# Patient Record
Sex: MALE | Race: WHITE | NOT HISPANIC OR LATINO | Employment: OTHER | URBAN - METROPOLITAN AREA
[De-identification: names, ages, dates, MRNs, and addresses within clinical notes are randomized per-mention and may not be internally consistent; named-entity substitution may affect disease eponyms.]

---

## 2017-01-27 ENCOUNTER — TELEPHONE (OUTPATIENT)
Dept: INTERNAL MEDICINE | Facility: CLINIC | Age: 57
End: 2017-01-27

## 2017-01-27 DIAGNOSIS — G89.29 CHRONIC BILATERAL LOW BACK PAIN WITH LEFT-SIDED SCIATICA: Primary | ICD-10-CM

## 2017-01-27 DIAGNOSIS — M54.42 CHRONIC BILATERAL LOW BACK PAIN WITH LEFT-SIDED SCIATICA: Primary | ICD-10-CM
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
## 2017-01-27 NOTE — TELEPHONE ENCOUNTER
----- Message from Felicia Green sent at 1/27/2017  2:52 PM CST -----  Contact: Danny(friend)  _x  1st Request  _  2nd Request  _  3rd Request        Who: Danny(friend)    Why: Patient would like a letter stating that he is unable to work. Please give patient a call back to discuss. thanks!    What Number to Call Back: [phone] or [phone]    When to Expect a call back: (Before the end of the day)   -- if the call is after 12:00, the call back will be tomorrow.

## 2017-01-30 NOTE — TELEPHONE ENCOUNTER
Pt care taker states can you write him a temporary letter stating pt cannot work, after reviewing pt chart, did not see order for [facility],please advise

## 2017-02-02 NOTE — TELEPHONE ENCOUNTER
Contacted patient friend to inform him patient needs to be evaluated by [facility] for his disabilities. He stated he will contact his other friend and find out which one can bring him to his appointment. The telephone number to [facility] was given. They will contact us back if further assistance is needed.

## 2017-02-03 ENCOUNTER — TELEPHONE (OUTPATIENT)
Dept: INTERNAL MEDICINE | Facility: CLINIC | Age: 57
End: 2017-02-03

## 2017-02-03 NOTE — TELEPHONE ENCOUNTER
Pt states that he already has an appointment set up for 2/13. Pt has no further questions/concerns at this time.

## 2017-02-03 NOTE — TELEPHONE ENCOUNTER
----- Message from Akosua Call sent at 2/3/2017 10:16 AM CST -----  Contact: Danny(friend)  X  1st Request  _  2nd Request  _  3rd Request        Who: Danny(friend)    Why: Pt friend is calling to find out about getting the pt scheduled with [facility].  I tried to get pt scheduled with [facility] however they aren't accepting medicaid pt.  Pt says that he has not been approved for medicaid and would like to be seen as a cash pt.  Please contact pt friend to further discuss and advise.    What Number to Call Back:  [phone] or [phone]    When to Expect a call back: (Before the end of the day)   -- if the call is after 12:00, the call back will be tomorrow.

## 2017-02-13 ENCOUNTER — TELEPHONE (OUTPATIENT)
Dept: INTERNAL MEDICINE | Facility: CLINIC | Age: 57
End: 2017-02-13

## 2017-02-13 ENCOUNTER — OFFICE VISIT (OUTPATIENT)
Dept: PHYSICAL MEDICINE AND REHAB | Facility: CLINIC | Age: 57
End: 2017-02-13
Payer: MEDICAID

## 2017-02-13 VITALS
WEIGHT: 116.88 LBS | DIASTOLIC BLOOD PRESSURE: 110 MMHG | BODY MASS INDEX: 18.35 KG/M2 | HEIGHT: 67 IN | HEART RATE: 77 BPM | SYSTOLIC BLOOD PRESSURE: 202 MMHG

## 2017-02-13 DIAGNOSIS — M54.42 CHRONIC MIDLINE LOW BACK PAIN WITH LEFT-SIDED SCIATICA: Primary | ICD-10-CM

## 2017-02-13 DIAGNOSIS — G89.29 CHRONIC MIDLINE LOW BACK PAIN WITH LEFT-SIDED SCIATICA: Primary | ICD-10-CM

## 2017-02-13 DIAGNOSIS — M47.816 LUMBAR FACET ARTHROPATHY: ICD-10-CM

## 2017-02-13 DIAGNOSIS — M51.36 DDD (DEGENERATIVE DISC DISEASE), LUMBAR: ICD-10-CM

## 2017-02-13 PROCEDURE — 99999 PR PBB SHADOW E&M-EST. PATIENT-LVL II: CPT | Mod: PBBFAC,,, | Performed by: PHYSICAL MEDICINE & REHABILITATION

## 2017-02-13 PROCEDURE — 99499 UNLISTED E&M SERVICE: CPT | Mod: S$PBB,,, | Performed by: PHYSICAL MEDICINE & REHABILITATION

## 2017-02-13 PROCEDURE — 99212 OFFICE O/P EST SF 10 MIN: CPT | Mod: PBBFAC | Performed by: PHYSICAL MEDICINE & REHABILITATION

## 2017-02-13 NOTE — TELEPHONE ENCOUNTER
----- Message from Felicia Green sent at 2/13/2017 10:16 AM CST -----  Contact: Ember Casillas (Friend)  _x  1st Request  _  2nd Request  _  3rd Request        Who: Ember Casillas (Friend)    Why: Patient's friend would like a call back says patient was suppose to be seen today at a rehab facility but the doctor that patient was scheduled with was for pain. [place] would like to see someone today by a physical therapist .  Please give patient a call back at your earliest convenience. Thanks!       What Number to Call Back: [phone]    When to Expect a call back: (Before the end of the day)   -- if the call is after 12:00, the call back will be tomorrow.

## 2017-02-13 NOTE — TELEPHONE ENCOUNTER
Patient friend contacted the office stating Dr. Coker told them they do not access for disability but only for pain. He states he needs a letter stating he is disabled so he can get food stamps or assistance. Patient friend wants to know if you can write this letter. Please advise and authorize.

## 2017-02-13 NOTE — PROGRESS NOTES
Subjective:       Patient ID: Daron Greco is a 56 y.o. male.    Chief Complaint: No chief complaint on file.    HPI     The patient came accompanied by a friend.  The pt has severe hearing impairment.  The friend indicated the pt is here only for a disability evaluation, specifically a Functional Capacity Evaluation.  He is not seeking treatment for his back pain.    Review of Systems       Objective:      Physical Exam    Not done   Assessment:       1. Chronic midline low back pain with left-sided sciatica    2. DDD (degenerative disc disease), lumbar    3. Lumbar facet arthropathy        Plan:        They were given information about [facility] at [place].  He can return PRN if he needs pain management.

## 2017-02-13 NOTE — LETTER
February 15, 2017      Josué Escobar MD  [address]  [address]  [facility] [address]           [facility]  [address]  [facility] [address]  Phone: [phone]          Patient: Daron Greco   MR Number: [id number]   YOB: 1960   Date of Visit: 2/13/2017       Dear Dr. Josué Escobar:    Thank you for referring Daron Greco to me for evaluation. Attached you will find relevant portions of my assessment and plan of care.    If you have questions, please do not hesitate to call me. I look forward to following Daron Greco along with you.    Sincerely,    Naz Coker MD    Enclosure  CC:  No Recipients    If you would like to receive this communication electronically, please contact [email or web address] or [phone] to request more information on [employer] access.    For providers and/or their staff who would like to refer a patient to [facility], please contact us through our one-stop-shop provider referral line, [facility], at [phone].    If you feel you have received this communication in error or would no longer like to receive these types of communications, please e-mail [email or web address]

## 2017-02-16 ENCOUNTER — TELEPHONE (OUTPATIENT)
Dept: INTERNAL MEDICINE | Facility: CLINIC | Age: 57
End: 2017-02-16

## 2017-02-16 DIAGNOSIS — G89.29 CHRONIC LOW BACK PAIN, UNSPECIFIED BACK PAIN LATERALITY, WITH SCIATICA PRESENCE UNSPECIFIED: Primary | ICD-10-CM

## 2017-02-16 DIAGNOSIS — M54.5 CHRONIC LOW BACK PAIN, UNSPECIFIED BACK PAIN LATERALITY, WITH SCIATICA PRESENCE UNSPECIFIED: Primary | ICD-10-CM

## 2017-02-16 NOTE — TELEPHONE ENCOUNTER
Patient caregiver contacted the office again requesting assistance in getting patient evaluated for his disability.

## 2017-02-16 NOTE — TELEPHONE ENCOUNTER
Left patient caregiver a message to inform him that no one within [facility] do functional capacity evaluations. Dr. Escobar did put in a referral for a outpatient  that may be able to assist in getting patient some assistance.

## 2017-02-16 NOTE — TELEPHONE ENCOUNTER
----- Message from Dennise Melendrez sent at 2/16/2017 11:02 AM CST -----  _  1st Request  _  2nd Request  XXX_  3rd Request        Who: Ember Argueta    Why: pt care giver called again today requesting to talk to your office concerning setting up and evaluation for the patient. Please call     What Number to Call Back:[phone]    When to Expect a call back: (Before the end of the day)   -- if the call is after 12:00, the call back will be tomorrow.

## 2017-02-16 NOTE — TELEPHONE ENCOUNTER
----- Message from Cookiejordan Go sent at 2/15/2017  4:37 PM CST -----  Contact: Ember Argueta  _  1st Request  _x  2nd Request  _  3rd Request        Who: Ember    Why: Caregiver called he stated that the patient needs an assessment as soon as possible to receive additional benefits. Please call him as soon as possible.     What Number to Call Back: [phone]    When to Expect a call back: (Before the end of the day)   -- if the call is after 12:00, the call back will be tomorrow.

## 2017-02-16 NOTE — TELEPHONE ENCOUNTER
Spoke with patient caregiver to inform him that [facility] state they do not do functional capacity evaluation. I informed him to try [facility] because I was unable to find anyone who accept Medicaid.

## 2017-02-22 ENCOUNTER — OUTPATIENT CASE MANAGEMENT (OUTPATIENT)
Dept: ADMINISTRATIVE | Facility: OTHER | Age: 57
End: 2017-02-22

## 2017-02-22 NOTE — PROGRESS NOTES
Please note the following patient information has been forwarded to [facility]/ Medicaid for Case Management or .    Please see the media section of the patient's chart for additional details.    Please contact Outpatient Complex Care Management at ext [phone] with any questions.    Thank you    Leisa Gunderson, SSC

## 2017-02-23 ENCOUNTER — TELEPHONE (OUTPATIENT)
Dept: INTERNAL MEDICINE | Facility: CLINIC | Age: 57
End: 2017-02-23

## 2017-02-23 NOTE — TELEPHONE ENCOUNTER
----- Message from Leisa Gunderson sent at 2/22/2017  3:57 PM CST -----  Please note the following patient information has been forwarded to [facility]/ Medicaid for Case Management or .    Please see the media section of the patient's chart for additional details.    Please contact Outpatient Complex Care Management at ext [phone] with any questions.    Thank you    Leisa Gunderson, SSC

## 2017-07-10 DIAGNOSIS — Z12.11 COLON CANCER SCREENING: ICD-10-CM

## 2018-05-25 DIAGNOSIS — Z12.11 COLON CANCER SCREENING: ICD-10-CM

## 2018-06-08 DIAGNOSIS — Z11.59 NEED FOR HEPATITIS C SCREENING TEST: ICD-10-CM

## 2020-01-02 PROBLEM — D64.9 SYMPTOMATIC ANEMIA: Status: ACTIVE | Noted: 2020-01-02

## 2020-02-19 PROBLEM — D64.9 ANEMIA: Status: ACTIVE | Noted: 2020-02-19

## 2020-02-20 PROBLEM — D50.0 IRON DEFICIENCY ANEMIA DUE TO CHRONIC BLOOD LOSS: Status: ACTIVE | Noted: 2020-02-20
